# Patient Record
Sex: FEMALE | Race: WHITE | NOT HISPANIC OR LATINO | Employment: OTHER | URBAN - METROPOLITAN AREA
[De-identification: names, ages, dates, MRNs, and addresses within clinical notes are randomized per-mention and may not be internally consistent; named-entity substitution may affect disease eponyms.]

---

## 2017-03-15 ENCOUNTER — ANTICOAGULATION MONITORING (OUTPATIENT)
Dept: VASCULAR LAB | Facility: MEDICAL CENTER | Age: 82
End: 2017-03-15

## 2017-03-15 DIAGNOSIS — I63.89 CEREBRAL INFARCTION DUE TO OTHER MECHANISM (HCC): ICD-10-CM

## 2017-03-15 DIAGNOSIS — I63.00 CEREBRAL INFARCTION DUE TO THROMBOSIS OF PRECEREBRAL ARTERY (HCC): ICD-10-CM

## 2017-04-26 ENCOUNTER — TELEPHONE (OUTPATIENT)
Dept: HEALTH INFORMATION MANAGEMENT | Facility: OTHER | Age: 82
End: 2017-04-26

## 2017-04-26 ENCOUNTER — PATIENT OUTREACH (OUTPATIENT)
Dept: HEALTH INFORMATION MANAGEMENT | Facility: OTHER | Age: 82
End: 2017-04-26

## 2017-04-26 NOTE — TELEPHONE ENCOUNTER
Patient is over the recommended age and is showing overdue for PAP SMEAR, MAMMOGRAM AND BONE DENSITY in Health Maintenance.    Please reply to this message as to whether these tests are appropriate and I will update the Health Maintenance Topic or contact the patient to schedule.

## 2017-04-26 NOTE — Clinical Note
4/26/2017  Oralia CAMPBELL [place]  [address] [address]     Dear Oralia,    Based on your medical history, our records indicate that you are eligible for [facility] Care Management, a free program that focuses on coordinating the care of individuals with ongoing or complex medical needs. Coordinated care can decrease the total cost of care and, most importantly, improve your overall health.    As a Care Management participant, you’ll be assigned a personal care manager - a medical provider that specializes in carefully monitoring your health and providing care in between visits with your primary care provider and specialists. This program is of no cost to you as it’s a part of [facility]’s ongoing commitment to serving the people of our community.    Benefits of our free Care Management program include:  • Priority appointment scheduling with your [facility] healthcare team  • A personal care manager to coordinate your healthcare   • A comprehensive needs assessment  • Development of an individualized care plan   • Chronic disease education  • A free in-home monitoring device (for eligible patients)    Please contact us at [phone] as soon as possible to confirm your enrollment. Once you have confirmed your participation in this program, we will schedule an introduction with your personal care manager.     For your convenience, we are available 7 days a week from 8:30 a.m. - 7 p.m.    We look forward to speaking with you soon.    Sincerely,   Prema GAMA

## 2017-04-26 NOTE — PROGRESS NOTES
Outcome: PHONE DISCONNECTED    WebIZ Checked & Epic Updated:  yes    [facility] Verified: no    Attempt # 1

## 2017-11-10 ENCOUNTER — PATIENT MESSAGE (OUTPATIENT)
Dept: HEALTH INFORMATION MANAGEMENT | Facility: OTHER | Age: 82
End: 2017-11-10

## 2017-11-13 ENCOUNTER — TELEPHONE (OUTPATIENT)
Dept: HEALTH INFORMATION MANAGEMENT | Facility: OTHER | Age: 82
End: 2017-11-13

## 2017-11-13 DIAGNOSIS — I50.32 CHRONIC DIASTOLIC CONGESTIVE HEART FAILURE, NYHA CLASS 3 (HCC): Chronic | ICD-10-CM

## 2017-11-13 NOTE — TELEPHONE ENCOUNTER
Oralia has been identified as a patient who could benefit from the services of the Heart Failure Program with [facility] Cardiology department. Please review the pended referral order and sign if this is a service that you wish for Oralia to receive.

## 2021-01-14 DIAGNOSIS — Z23 NEED FOR VACCINATION: ICD-10-CM
